# Patient Record
Sex: MALE | Race: WHITE | Employment: STUDENT | URBAN - METROPOLITAN AREA
[De-identification: names, ages, dates, MRNs, and addresses within clinical notes are randomized per-mention and may not be internally consistent; named-entity substitution may affect disease eponyms.]

---
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
## 2022-04-20 ENCOUNTER — HOSPITAL ENCOUNTER (OUTPATIENT)
Age: 22
Discharge: HOME | End: 2022-04-20
Payer: COMMERCIAL

## 2022-04-20 VITALS
OXYGEN SATURATION: 99 % | DIASTOLIC BLOOD PRESSURE: 62 MMHG | TEMPERATURE: 98 F | HEART RATE: 66 BPM | SYSTOLIC BLOOD PRESSURE: 137 MMHG | RESPIRATION RATE: 20 BRPM

## 2022-04-20 DIAGNOSIS — K12.2 UVULITIS: Primary | ICD-10-CM

## 2022-04-20 DIAGNOSIS — J02.9 VIRAL PHARYNGITIS: ICD-10-CM

## 2022-04-20 LAB
S PYO AG THROAT QL: NEGATIVE
SARS-COV-2 RNA RESP QL NAA+PROBE: NOT DETECTED

## 2022-04-20 PROCEDURE — 99213 OFFICE O/P EST LOW 20 MIN: CPT

## 2022-04-20 PROCEDURE — 87880 STREP A ASSAY W/OPTIC: CPT

## 2022-04-20 RX ORDER — DEXAMETHASONE 4 MG/1
12 TABLET ORAL ONCE
Status: COMPLETED | OUTPATIENT
Start: 2022-04-20 | End: 2022-04-20

## 2022-04-20 NOTE — ED INITIAL ASSESSMENT (HPI)
Pt has had a sore throat for the past 2 days ,wouold like to be checked for covid, and strep,   Has had mono in past

## 2022-07-20 ENCOUNTER — HOSPITAL ENCOUNTER (OUTPATIENT)
Age: 22
Discharge: HOME | End: 2022-07-20
Payer: COMMERCIAL

## 2022-07-20 ENCOUNTER — WALK IN (OUTPATIENT)
Dept: URGENT CARE | Age: 22
End: 2022-07-20

## 2022-07-20 VITALS
HEART RATE: 79 BPM | OXYGEN SATURATION: 99 % | SYSTOLIC BLOOD PRESSURE: 126 MMHG | TEMPERATURE: 99 F | DIASTOLIC BLOOD PRESSURE: 75 MMHG | RESPIRATION RATE: 16 BRPM

## 2022-07-20 DIAGNOSIS — R09.82 PND (POST-NASAL DRIP): ICD-10-CM

## 2022-07-20 DIAGNOSIS — B34.9 VIRAL SYNDROME: Primary | ICD-10-CM

## 2022-07-20 LAB
POCT MONO: NEGATIVE
S PYO AG THROAT QL: NEGATIVE
SARS-COV-2 RNA RESP QL NAA+PROBE: NOT DETECTED

## 2022-07-20 PROCEDURE — 99213 OFFICE O/P EST LOW 20 MIN: CPT

## 2022-07-20 PROCEDURE — 36415 COLL VENOUS BLD VENIPUNCTURE: CPT

## 2022-07-20 PROCEDURE — 87880 STREP A ASSAY W/OPTIC: CPT

## 2022-07-20 PROCEDURE — 86308 HETEROPHILE ANTIBODY SCREEN: CPT | Performed by: PHYSICIAN ASSISTANT

## 2022-07-21 NOTE — ED INITIAL ASSESSMENT (HPI)
Cough -  Started MOnday  Fever- - started Monday temp 101 intermittent. Takes advil 2 tabs last dose at 630pm  Sore throat -  Swollen glands , feels sore. Pt did 2 home covid test 2 days result was negative.  Pt is not vaccinated

## 2023-08-27 ENCOUNTER — HOSPITAL ENCOUNTER (OUTPATIENT)
Age: 23
Discharge: HOME | End: 2023-08-27
Payer: COMMERCIAL

## 2023-08-27 ENCOUNTER — APPOINTMENT (OUTPATIENT)
Dept: GENERAL RADIOLOGY | Age: 23
End: 2023-08-27
Attending: NURSE PRACTITIONER
Payer: COMMERCIAL

## 2023-08-27 VITALS
BODY MASS INDEX: 27.92 KG/M2 | HEART RATE: 73 BPM | TEMPERATURE: 98 F | OXYGEN SATURATION: 100 % | HEIGHT: 70 IN | RESPIRATION RATE: 18 BRPM | DIASTOLIC BLOOD PRESSURE: 76 MMHG | SYSTOLIC BLOOD PRESSURE: 139 MMHG | WEIGHT: 195 LBS

## 2023-08-27 DIAGNOSIS — S43.402A SPRAIN OF LEFT SHOULDER, UNSPECIFIED SHOULDER SPRAIN TYPE, INITIAL ENCOUNTER: Primary | ICD-10-CM

## 2023-08-27 PROCEDURE — 99213 OFFICE O/P EST LOW 20 MIN: CPT

## 2023-08-27 PROCEDURE — 99214 OFFICE O/P EST MOD 30 MIN: CPT

## 2023-08-27 PROCEDURE — 73030 X-RAY EXAM OF SHOULDER: CPT | Performed by: NURSE PRACTITIONER

## 2023-08-27 NOTE — ED INITIAL ASSESSMENT (HPI)
C/o left shoulder injury since last night. Pt reports he was boxing and when he threw a jab out his shoulder came out of socket. Pt reports immediate pain and fell to the floor. Pt report putting his arm back in socket but his arm is is still sore. Pt denies otc meds used for pain. ROM with pain.

## 2023-12-15 ENCOUNTER — APPOINTMENT (OUTPATIENT)
Dept: GENERAL RADIOLOGY | Age: 23
End: 2023-12-15
Attending: PHYSICIAN ASSISTANT
Payer: COMMERCIAL

## 2023-12-15 ENCOUNTER — HOSPITAL ENCOUNTER (OUTPATIENT)
Age: 23
Discharge: HOME | End: 2023-12-15
Payer: COMMERCIAL

## 2023-12-15 VITALS
TEMPERATURE: 99 F | DIASTOLIC BLOOD PRESSURE: 74 MMHG | RESPIRATION RATE: 18 BRPM | HEART RATE: 84 BPM | SYSTOLIC BLOOD PRESSURE: 133 MMHG | OXYGEN SATURATION: 97 %

## 2023-12-15 DIAGNOSIS — B34.9 VIRAL SYNDROME: ICD-10-CM

## 2023-12-15 DIAGNOSIS — J20.8 ACUTE VIRAL BRONCHITIS: Primary | ICD-10-CM

## 2023-12-15 LAB
POCT INFLUENZA A: NEGATIVE
POCT INFLUENZA B: NEGATIVE
SARS-COV-2 RNA RESP QL NAA+PROBE: NOT DETECTED

## 2023-12-15 PROCEDURE — 99214 OFFICE O/P EST MOD 30 MIN: CPT

## 2023-12-15 PROCEDURE — 87502 INFLUENZA DNA AMP PROBE: CPT | Performed by: PHYSICIAN ASSISTANT

## 2023-12-15 PROCEDURE — 71046 X-RAY EXAM CHEST 2 VIEWS: CPT | Performed by: PHYSICIAN ASSISTANT

## 2023-12-15 RX ORDER — ALBUTEROL SULFATE 90 UG/1
2 AEROSOL, METERED RESPIRATORY (INHALATION) EVERY 4 HOURS PRN
Qty: 1 EACH | Refills: 0 | Status: SHIPPED | OUTPATIENT
Start: 2023-12-15 | End: 2024-01-14

## 2023-12-15 RX ORDER — BENZONATATE 200 MG/1
200 CAPSULE ORAL 3 TIMES DAILY PRN
Qty: 30 CAPSULE | Refills: 0 | Status: SHIPPED | OUTPATIENT
Start: 2023-12-15 | End: 2023-12-22

## 2023-12-15 RX ORDER — PREDNISONE 20 MG/1
40 TABLET ORAL DAILY
Qty: 10 TABLET | Refills: 0 | Status: SHIPPED | OUTPATIENT
Start: 2023-12-15 | End: 2023-12-20

## 2023-12-15 NOTE — ED INITIAL ASSESSMENT (HPI)
C/o productive cough with green sputum for 6 days. Pt reports nasal congestion, head and ear pressure, fever. Pt reports father was sick. Pt denies travel. Pt reports otc meds used.

## 2023-12-19 ENCOUNTER — APPOINTMENT (OUTPATIENT)
Dept: GENERAL RADIOLOGY | Age: 23
End: 2023-12-19
Attending: EMERGENCY MEDICINE
Payer: COMMERCIAL

## 2023-12-19 ENCOUNTER — HOSPITAL ENCOUNTER (OUTPATIENT)
Age: 23
Discharge: HOME | End: 2023-12-19
Attending: EMERGENCY MEDICINE
Payer: COMMERCIAL

## 2023-12-19 VITALS
SYSTOLIC BLOOD PRESSURE: 136 MMHG | OXYGEN SATURATION: 99 % | DIASTOLIC BLOOD PRESSURE: 81 MMHG | RESPIRATION RATE: 20 BRPM | BODY MASS INDEX: 28.63 KG/M2 | TEMPERATURE: 98 F | HEIGHT: 70 IN | WEIGHT: 200 LBS | HEART RATE: 76 BPM

## 2023-12-19 DIAGNOSIS — J20.8 VIRAL BRONCHITIS: ICD-10-CM

## 2023-12-19 DIAGNOSIS — R05.8 POST-VIRAL COUGH SYNDROME: Primary | ICD-10-CM

## 2023-12-19 PROCEDURE — 71046 X-RAY EXAM CHEST 2 VIEWS: CPT | Performed by: EMERGENCY MEDICINE

## 2023-12-19 PROCEDURE — 99214 OFFICE O/P EST MOD 30 MIN: CPT

## 2023-12-19 PROCEDURE — 99213 OFFICE O/P EST LOW 20 MIN: CPT

## 2023-12-19 RX ORDER — CODEINE PHOSPHATE AND GUAIFENESIN 10; 100 MG/5ML; MG/5ML
10 SOLUTION ORAL 3 TIMES DAILY PRN
Qty: 120 ML | Refills: 0 | Status: SHIPPED | OUTPATIENT
Start: 2023-12-19

## 2023-12-19 NOTE — ED INITIAL ASSESSMENT (HPI)
Here recently, finished antibiotics and still has a cough. States he has 1 coughing fit a day. No recent fever.

## 2023-12-19 NOTE — DISCHARGE INSTRUCTIONS
Follow-up with your primary care doctor as needed  Take Cheratussin cough medicine every 6-8 hours as needed for cough. Do not mix with alcohol or other sedatives.   Do not take before driving or operating any heavy machinery  Continue to use your albuterol inhaler 2 puffs every 4 hours as needed  Return if any worsening symptoms or new concerns

## 2024-07-01 ENCOUNTER — HOSPITAL ENCOUNTER (OUTPATIENT)
Age: 24
Discharge: HOME | End: 2024-07-01
Payer: COMMERCIAL

## 2024-07-01 VITALS
BODY MASS INDEX: 30.06 KG/M2 | TEMPERATURE: 98 F | HEART RATE: 64 BPM | RESPIRATION RATE: 18 BRPM | DIASTOLIC BLOOD PRESSURE: 80 MMHG | SYSTOLIC BLOOD PRESSURE: 137 MMHG | HEIGHT: 70 IN | WEIGHT: 210 LBS | OXYGEN SATURATION: 97 %

## 2024-07-01 DIAGNOSIS — L60.0 INGROWN TOENAIL OF LEFT FOOT WITH INFECTION: Primary | ICD-10-CM

## 2024-07-01 PROCEDURE — 99213 OFFICE O/P EST LOW 20 MIN: CPT

## 2024-07-01 PROCEDURE — 11750 EXCISION NAIL&NAIL MATRIX: CPT

## 2024-07-01 RX ORDER — SULFAMETHOXAZOLE AND TRIMETHOPRIM 800; 160 MG/1; MG/1
1 TABLET ORAL 2 TIMES DAILY
Qty: 14 TABLET | Refills: 0 | Status: SHIPPED | OUTPATIENT
Start: 2024-07-01 | End: 2024-07-08

## 2024-07-01 NOTE — ED INITIAL ASSESSMENT (HPI)
Pt complaining of left toe infection x 1 month. Denies drainage but states it feels like there is pus in a bubble. No sick symptoms.

## 2024-07-01 NOTE — DISCHARGE INSTRUCTIONS
Start Bactrim: take 1 tab twice a day for 7 days.   Mupirocin ointment to toe twice a day.   Warm soaks twice a day in clean warm water.     Follow up with Dr. Peck- her number is attached.

## 2024-07-03 NOTE — ED PROVIDER NOTES
Patient Seen in: [facility]      History     Chief Complaint   Patient presents with    Leg or Foot Injury     Stated Complaint: C/O worsening left great toe infection present for a month    Subjective:   24 yo male presents with complaint of left great toe infection and pain x 1 month.   Painful when climbing up ladder at work today.   No trauma to foot.   Pt denies fever, chills, malaise.     The history is provided by the patient.           Objective:   Past Medical History:    Mononucleosis    Osgood-Schlatter's disease    Improved s/p PT               History reviewed. No pertinent surgical history.             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 +pet dog. No smokers. +CO and smoke detectors. In 9th grade at [employer]               Review of Systems   Constitutional:  Negative for chills and fever.       Positive for stated Chief Complaint: Leg or Foot Injury    Other systems are as noted in HPI.  Constitutional and vital signs reviewed.      All other systems reviewed and negative except as noted above.    Physical Exam     ED Triage Vitals [07/01/24 1817]   /80   Pulse 64   Resp 18   Temp 98 °F (36.7 °C)   Temp src Temporal   SpO2 97 %   O2 Device None (Room air)       Current Vitals:   Vital Signs  BP: 137/80  Pulse: 64  Resp: 18  Temp: 98 °F (36.7 °C)  Temp src: Temporal    Oxygen Therapy  SpO2: 97 %  O2 Device: None (Room air)            Physical Exam  Constitutional:       Appearance: Normal appearance.   Cardiovascular:      Rate and Rhythm: Normal rate and regular rhythm.   Musculoskeletal:      Right lower leg: No edema.       Left lower leg: No edema.   Feet:      Left foot:      Skin integrity: Erythema (left great toe: Lateral aspect of affected digit is swollen, erythematous, and ttp. Granulation tissue present. No paronychia present.) present.      Toenail Condition: Left toenails are ingrown.   Skin:     General: Skin is warm and dry.   Neurological:      Mental Status: He is alert.   Psychiatric:         Mood and Affect: Mood normal.               ED Course   Labs Reviewed - No data to display  Verbal consent obtained.   Toe aseptically prepped.  Digital block with 1% xylocaine.  Nail edge lifted and excised.  Antibiotic ointment, gauze and bandage applied.  Tolerated well. Wound care discussed            MDM                   Medical Decision Making  24 yo male presents with complaint of left great ingrown toenail infection and pain x 1 month.  Diff dx include paronychia vs ingrown toenail.  Lateral edge of nail removed-see procedure note.   Pt tolerated well.  Warm soaks, Mupirocin ointment BID, Bactrim.   Podiatry referral provided.   Urgent return precautions discussed.   Pt understands and is agreeable to plan.        Amount and/or Complexity of Data Reviewed  External Data Reviewed: notes.    Risk  OTC drugs.  Prescription drug management.        Disposition and Plan     Clinical Impression:  1. Ingrown toenail of left foot with infection         Disposition:  Discharge  7/1/2024  6:59 pm    Follow-up:  Dana Peck DPM  [address] [place] [id number]  [phone]                Medications Prescribed:  Discharge Medication List as of 7/1/2024  6:59 PM        START taking these medications    Details   sulfamethoxazole-trimethoprim -160 MG Oral Tab per tablet Take 1 tablet by mouth 2 (two) times daily for 7 days., Normal, Disp-14 tablet, R-0

## 2024-08-14 ENCOUNTER — HOSPITAL ENCOUNTER (OUTPATIENT)
Age: 24
Discharge: HOME | End: 2024-08-14
Payer: COMMERCIAL

## 2024-08-14 VITALS
HEIGHT: 70 IN | DIASTOLIC BLOOD PRESSURE: 63 MMHG | OXYGEN SATURATION: 99 % | WEIGHT: 215 LBS | HEART RATE: 78 BPM | TEMPERATURE: 99 F | BODY MASS INDEX: 30.78 KG/M2 | SYSTOLIC BLOOD PRESSURE: 124 MMHG | RESPIRATION RATE: 16 BRPM

## 2024-08-14 DIAGNOSIS — S61.011A THUMB LACERATION, RIGHT, INITIAL ENCOUNTER: Primary | ICD-10-CM

## 2024-08-14 PROCEDURE — 99213 OFFICE O/P EST LOW 20 MIN: CPT

## 2024-08-14 PROCEDURE — 12002 RPR S/N/AX/GEN/TRNK2.6-7.5CM: CPT

## 2024-08-14 RX ORDER — METHYLPREDNISOLONE 4 MG/1
TABLET ORAL AS DIRECTED
COMMUNITY

## 2024-08-14 NOTE — DISCHARGE INSTRUCTIONS
Please return to the ER/clinic if symptoms worsen. Follow-up with your PCP in 24-48 hours as needed.    Gently cleanse the area daily. Look for any signs and symptoms of infection: redness, swelling, streaking, drainage or fevers.  Suture removal in approximately 10 days.

## 2024-08-14 NOTE — ED PROVIDER NOTES
Patient Seen in: [facility]      History     Chief Complaint   Patient presents with    Laceration/Abrasion     Stated Complaint: Laceration    Subjective:   HPI    22 yo female here with complaint of a laceration to his right thumb that occurred prior to arrival on a transformer.  Patient denies any further injury trauma.  Patient is up-to-date with tetanus.  Patient denies chest pain, shortness of breath, cough, abdominal pain, nausea, vomiting or diarrhea.  Afebrile.      Objective:   Past Medical History:    Mononucleosis    Osgood-Schlatter's disease    Improved s/p PT               History reviewed. No pertinent surgical history.           The patient's medication list, past medical history and social history elements  as listed in today's nurse's notes are reviewed and agree.   The patient's family history is reviewed and is noncontributory to the presenting problem, except as indicated as above.     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 +pet dog. No smokers. +CO and smoke detectors. In 9th grade at [employer]               Review of Systems    Positive for stated Chief Complaint: Laceration/Abrasion    Other systems are as noted in HPI.  Constitutional and vital signs reviewed.      All other systems reviewed and negative except as noted above.    Physical Exam     ED Triage Vitals [08/14/24 1547]   /63   Pulse 78   Resp 16   Temp 98.9 °F (37.2 °C)   Temp src Temporal   SpO2 99 %   O2 Device None (Room air)       Current Vitals:   Vital Signs  BP: 124/63  Pulse: 78  Resp: 16  Temp: 98.9 °F (37.2 °C)  Temp src: Temporal    Oxygen  Therapy  SpO2: 99 %  O2 Device: None (Room air)            Physical Exam  Vitals and nursing note reviewed.   Constitutional:       Appearance: Normal appearance. He is well-developed.   HENT:      Head: Normocephalic.      Right Ear: External ear normal.      Left Ear: External ear normal.      Nose: Nose normal.      Mouth/Throat:      Mouth: Mucous membranes are moist.   Eyes:      Conjunctiva/sclera: Conjunctivae normal.      Pupils: Pupils are equal, round, and reactive to light.   Cardiovascular:      Rate and Rhythm: Normal rate and regular rhythm.      Heart sounds: Normal heart sounds.   Pulmonary:      Effort: Pulmonary effort is normal.      Breath sounds: Normal breath sounds.   Musculoskeletal:      Cervical back: Normal range of motion and neck supple.   Skin:     General: Skin is warm.      Capillary Refill: Capillary refill takes less than 2 seconds.      Findings: Laceration present.      Comments: R thumb: approx 3cm laceration noted to the tip above the DIP joint : FROM, N/V intact, tendon function intact, strength 5/5   Neurological:      General: No focal deficit present.      Mental Status: He is alert and oriented to person, place, and time.   Psychiatric:         Mood and Affect: Mood normal.         Behavior: Behavior normal.         Thought Content: Thought content normal.         Judgment: Judgment normal.             ED Course     Anesthesia Type:Lido 3ml  Location:R thumb  Size:approx 3 cm  Shape:horizontal avulsed  FB present:No  Cleansing:NS  Wound Closure:5.0 nylon, 6 interrupted sutures placed  N/V intact:Yes  TendonFunctionIntact:  DressingType:bacitracin/non-adherent/coban  Td:UTD  PT Tolerated:without complaint  After discussing the risks, benefits and alternatives patient expresses understanding and verbal consent.                  MDM           Clinical Impression: R thumb laceration  Course of Treatment:   Complete.  Gently cleanse the area daily. Look for any signs and  symptoms of infection: redness, swelling, streaking, drainage or fevers.  Suture removal in approximately 10 days.    The patient is encouraged to return if any concerning symptoms arise. Additional verbal discharge instructions are given and discussed. Discharge medications are discussed. The patient is in good condition throughout the visit today and remains so upon discharge. I discuss the plan of care with the patient, who expresses understanding. All questions and concerns are addressed to the patient's satisfaction prior to discharge today.  Previous conversations with PCP and charts were reviewed.                                   Disposition and Plan     Clinical Impression:  1. Thumb laceration, right, initial encounter         Disposition:  Discharge  8/14/2024  4:43 pm    Follow-up:  Maxi Chapman MD  [address]  [facility] [address]  [phone]                Medications Prescribed:  Current Discharge Medication List

## 2024-08-23 ENCOUNTER — HOSPITAL ENCOUNTER (OUTPATIENT)
Age: 24
Discharge: HOME | End: 2024-08-23
Payer: COMMERCIAL

## 2024-08-23 VITALS
SYSTOLIC BLOOD PRESSURE: 95 MMHG | WEIGHT: 215 LBS | BODY MASS INDEX: 30.78 KG/M2 | OXYGEN SATURATION: 96 % | TEMPERATURE: 98 F | HEIGHT: 70 IN | HEART RATE: 65 BPM | RESPIRATION RATE: 18 BRPM | DIASTOLIC BLOOD PRESSURE: 60 MMHG

## 2024-08-23 DIAGNOSIS — Z48.02 ENCOUNTER FOR REMOVAL OF SUTURES: Primary | ICD-10-CM

## 2024-08-23 NOTE — ED PROVIDER NOTES
Patient Seen in: [facility]      History     Chief Complaint   Patient presents with    Suture Removal     Stated Complaint: Stich removal    Subjective:   23-year-old male presents to the immediate care for a suture removal.  Patient states that she is about and for 9 days.  He denies any redness, swelling, or exudates.    The history is provided by the patient.         Objective:   Past Medical History:    Mononucleosis    Osgood-Schlatter's disease    Improved s/p PT               History reviewed. No pertinent surgical history.             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 +pet dog. No smokers. +CO and smoke detectors. In 9th grade at [employer]               Review of Systems   Constitutional: Negative.    Skin:  Positive for wound.   All other systems reviewed and are negative.      Positive for stated Chief Complaint: Suture Removal    Other systems are as noted in HPI.  Constitutional and vital signs reviewed.      All other systems reviewed and negative except as noted above.    Physical Exam     ED Triage Vitals [08/23/24 1254]   BP 95/60   Pulse 65   Resp 18   Temp 98.4 °F (36.9 °C)   Temp src Oral   SpO2 96 %   O2 Device None (Room air)       Current Vitals:   Vital Signs  BP: 95/60  Pulse: 65  Resp: 18  Temp: 98.4 °F (36.9 °C)  Temp src: Oral    Oxygen Therapy  SpO2: 96 %  O2 Device: None (Room air)            Physical Exam  Vitals and nursing note reviewed.   Constitutional:       General: He is not in acute distress.     Appearance: Normal appearance. He is normal weight. He is not ill-appearing.   HENT:      Head: Normocephalic  and atraumatic.      Mouth/Throat:      Mouth: Mucous membranes are moist.      Pharynx: Oropharynx is clear.   Eyes:      Conjunctiva/sclera: Conjunctivae normal.   Cardiovascular:      Rate and Rhythm: Normal rate and regular rhythm.      Pulses: Normal pulses.      Heart sounds: Normal heart sounds.   Pulmonary:      Effort: Pulmonary effort is normal. No respiratory distress.      Breath sounds: Normal breath sounds.   Musculoskeletal:         General: Normal range of motion.   Skin:     General: Skin is warm and dry.      Comments: 5 sutures to distal right thumb are clean dry and intact.  No evidence of infection.     Neurological:      General: No focal deficit present.      Mental Status: He is alert and oriented to person, place, and time.   Psychiatric:         Mood and Affect: Mood normal.         Behavior: Behavior normal.           ED Course   Labs Reviewed - No data to display           MDM              Medical Decision Making  24 yo male for suture removal.  Sutures are ready to be removed.     Sutures removed without difficulty.  No erythema, discharge, tenderness or drainage noted.    Discussed with patient continued wound care.  Patient can follow-up with PCP or return as needed.    Risk  OTC drugs.        Disposition and Plan     Clinical Impression:  1. Encounter for removal of sutures         Disposition:  Discharge  8/23/2024  1:36 pm    Follow-up:  Maxi Chapman MD  [address]  [address]  [facility] [address]  [phone]      As needed          Medications Prescribed:  Current Discharge Medication List

## 2025-06-15 ENCOUNTER — APPOINTMENT (OUTPATIENT)
Dept: ULTRASOUND IMAGING | Age: 25
End: 2025-06-15
Attending: NURSE PRACTITIONER
Payer: COMMERCIAL

## 2025-06-15 ENCOUNTER — HOSPITAL ENCOUNTER (OUTPATIENT)
Age: 25
Discharge: HOME | End: 2025-06-15
Payer: COMMERCIAL

## 2025-06-15 VITALS
BODY MASS INDEX: 30 KG/M2 | HEART RATE: 90 BPM | WEIGHT: 210 LBS | TEMPERATURE: 98 F | SYSTOLIC BLOOD PRESSURE: 135 MMHG | OXYGEN SATURATION: 100 % | RESPIRATION RATE: 18 BRPM | DIASTOLIC BLOOD PRESSURE: 73 MMHG

## 2025-06-15 DIAGNOSIS — N50.819 PAIN IN TESTICLE AS LATE EFFECT OF INJURY TO TESTICLE: Primary | ICD-10-CM

## 2025-06-15 DIAGNOSIS — S39.94XS PAIN IN TESTICLE AS LATE EFFECT OF INJURY TO TESTICLE: Primary | ICD-10-CM

## 2025-06-15 LAB
BILIRUB UR QL STRIP: NEGATIVE
COLOR UR: YELLOW
GLUCOSE UR STRIP-MCNC: NEGATIVE MG/DL
HGB UR QL STRIP: NEGATIVE
KETONES UR STRIP-MCNC: NEGATIVE MG/DL
LEUKOCYTE ESTERASE UR QL STRIP: NEGATIVE
NITRITE UR QL STRIP: NEGATIVE
PH UR STRIP: 6 [PH]
PROT UR STRIP-MCNC: NEGATIVE MG/DL
SP GR UR STRIP: 1.02
UROBILINOGEN UR STRIP-ACNC: <2 MG/DL

## 2025-06-15 PROCEDURE — 81002 URINALYSIS NONAUTO W/O SCOPE: CPT

## 2025-06-15 PROCEDURE — 76870 US EXAM SCROTUM: CPT | Performed by: NURSE PRACTITIONER

## 2025-06-15 PROCEDURE — 99214 OFFICE O/P EST MOD 30 MIN: CPT

## 2025-06-15 PROCEDURE — 99213 OFFICE O/P EST LOW 20 MIN: CPT

## 2025-06-15 PROCEDURE — 93975 VASCULAR STUDY: CPT | Performed by: NURSE PRACTITIONER

## 2025-06-15 NOTE — ED PROVIDER NOTES
Patient Seen in: [facility]       The following individual(s) verbally consented to be recorded using ambient AI listening technology and understand that they can each withdraw their consent to this listening technology at any point by asking the clinician to turn off or pause the recording:    Patient name: Jacek Lynch  Additional names:        History  No chief complaint on file.    Stated Complaint: eval g    Subjective:   HPI     Jacek Lynch is a 24 year old male who presents with left testicular pain following a sports-related injury.    Approximately two months ago, he experienced a direct impact to his left testicle during a baseball practice drill without wearing protective gear. The injury occurred when a ball skipped off the edge of a turf mat and struck him.    Since the incident, he has had persistent tenderness in the left testicle, particularly noticeable when sitting. He describes the need to 'man spread' to alleviate discomfort. No initial bruising or swelling was noted. No swelling or bruising of the testicles, issues with erections, or ejaculation difficulties.    He experienced a temporary decrease in urinary stream strength, which has since resolved. He noted changes in ejaculate consistency, describing it as more watery than usual, though this has mostly returned to normal. However, he still perceives a reduction in semen volume. He reports a sensation of needing to urinate following ejaculation, which he attributes to possible residual semen. No symptoms suggestive of a urinary tract infection and no concerns about sexually transmitted diseases.    He has not sought medical attention until now, expressing reluctance to visit a doctor for this issue.        Objective:     No pertinent past medical history.            No pertinent past surgical history.              No pertinent social history.            Review of Systems   All other systems reviewed and are  negative.      Positive for stated complaint: eval g  Other systems are as noted in HPI.  Constitutional and vital signs reviewed.      All other systems reviewed and negative except as noted above.                  Physical Exam    ED Triage Vitals [06/15/25 0946]   /73   Pulse 90   Resp 18   Temp 98.2 °F (36.8 °C)   Temp src Oral   SpO2 100 %   O2 Device None (Room air)       Current Vitals:   Vital Signs  BP: 135/73  Pulse: 90  Resp: 18  Temp: 98.2 °F (36.8 °C)  Temp src: Oral    Oxygen Therapy  SpO2: 100 %  O2 Device: None (Room air)            Physical Exam  Vitals and nursing note reviewed.   Constitutional:       Appearance: He is well-developed.   Cardiovascular:      Rate and Rhythm: Normal rate.   Pulmonary:      Effort: Pulmonary effort is normal.   Genitourinary:     Comments: L testicular swelling with tenderness. No hard masses palpable. No bruising. R normal.  Skin:     General: Skin is warm and dry.   Neurological:      Mental Status: He is alert and oriented to person, place, and time.                 ED Course  Labs Reviewed   [facility] POCT URINALYSIS DIPSTICK - Abnormal; Notable for the following components:       Result Value    Urine Clarity Turbid (*)     All other components within normal limits          US SCROTUM W/ DOPPLER (CPT=93975/51019)  Result Date: 6/15/2025  CONCLUSION:  Moderate left varicocele is noted.   LOCATION:  [facility]    Dictated by (CST): Ld Rayo MD on 6/15/2025 at 11:53 AM     Finalized by (CST): Ld Rayo MD on 6/15/2025 at 11:54 AM                         Medical Decision Making    Assessment & Plan  Testicular pain  Chronic left testicular pain for two months post-trauma. Differential includes hematoma. No infection or STD signs. Changes in ejaculate and sensation of incomplete bladder emptying suggest structural or functional issues.  - Order scrotal ultrasound to evaluate for hematoma and assess blood flow.  - Perform urinalysis to rule out infection or  urinary issues.    Differential diagnosis: scrotal hematoma, scrotal injury, torsion    Urine dip is normal.  Patient denies any STD concern.  Ultrasound shows left varicocele.  Patient discharged with follow-up with urology.    Disposition and Plan     Clinical Impression:  1. Pain in testicle as late effect of injury to testicle         Disposition:  Discharge  6/15/2025 11:37 am    Follow-up:  Gay Diez, PA-C  [address]   [address] [address]  [phone]    Call             Medications Prescribed:  Discharge Medication List as of 6/15/2025 11:43 AM                Supplementary Documentation:

## (undated) NOTE — LETTER
Date & Time: 4/20/2022, 11:18 AM  Patient: Rosendo Abreu  Encounter Provider(s):    Felicity Lamar PA-C       To Whom It May Concern:    Jonathan Vargas was seen and treated in our department on 4/20/2022. He should not return to work until 4/22/22.     If you have any questions or concerns, please do not hesitate to call.        _____________________________  Physician/APC Signature

## (undated) NOTE — LETTER
Date & Time: 4/5/2018, 8:10 PM  Patient: Jeet Osborne  Attending Provider:    Sincerely,    MEG Xiao         To Whom It May Concern:    Magali Crow was seen and treated in our department on 4/5/2018.  He may return to school tomorrow if

## (undated) NOTE — LETTER
[facility] IN [place] [address]  Dept: [phone]  Dept Fax: [phone]         December 10, 2019    Patient: Diaz German   YOB: 2000   Date of Visit: 12/10/2019       To Whom It May Conc

## (undated) NOTE — LETTER
Date & Time: 2/13/2019, 5:45 PM  Patient: Deandre Hardwick  Encounter Provider(s):    MEG Hayes       To Whom It May Concern:    Isidro Severino was seen and treated in our department on 2/13/2019.   Patient may return to school on Friday if fee

## (undated) NOTE — LETTER
Date & Time: 4/20/2022, 11:17 AM  Patient: Krys Valles  Encounter Provider(s):    Noam Al PA-C       To Whom It May Concern:    Aisha Pearson was seen and treated in our department on 4/20/2022. He should not return to work until 4/21/22.     If you have any questions or concerns, please do not hesitate to call.        _____________________________  Physician/APC Signature